# Patient Record
Sex: MALE | URBAN - METROPOLITAN AREA
[De-identification: names, ages, dates, MRNs, and addresses within clinical notes are randomized per-mention and may not be internally consistent; named-entity substitution may affect disease eponyms.]

---
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
## 2025-01-06 ENCOUNTER — OFFICE VISIT (OUTPATIENT)
Dept: FAMILY MEDICINE CLINIC | Facility: CLINIC | Age: 20
End: 2025-01-06
Payer: COMMERCIAL

## 2025-01-06 VITALS
WEIGHT: 120 LBS | HEIGHT: 66 IN | SYSTOLIC BLOOD PRESSURE: 120 MMHG | BODY MASS INDEX: 19.29 KG/M2 | OXYGEN SATURATION: 97 % | RESPIRATION RATE: 16 BRPM | HEART RATE: 60 BPM | TEMPERATURE: 98 F | DIASTOLIC BLOOD PRESSURE: 76 MMHG

## 2025-01-06 DIAGNOSIS — M54.50 ACUTE BILATERAL LOW BACK PAIN WITHOUT SCIATICA: Primary | ICD-10-CM

## 2025-01-06 DIAGNOSIS — M62.838 MUSCLE SPASM: ICD-10-CM

## 2025-01-06 PROCEDURE — 3074F SYST BP LT 130 MM HG: CPT | Performed by: NURSE PRACTITIONER

## 2025-01-06 PROCEDURE — 3008F BODY MASS INDEX DOCD: CPT | Performed by: NURSE PRACTITIONER

## 2025-01-06 PROCEDURE — 99202 OFFICE O/P NEW SF 15 MIN: CPT | Performed by: NURSE PRACTITIONER

## 2025-01-06 PROCEDURE — 3078F DIAST BP <80 MM HG: CPT | Performed by: NURSE PRACTITIONER

## 2025-01-06 RX ORDER — NAPROXEN 500 MG/1
500 TABLET ORAL 2 TIMES DAILY WITH MEALS
Qty: 20 TABLET | Refills: 0 | Status: SHIPPED | OUTPATIENT
Start: 2025-01-06 | End: 2025-01-16

## 2025-01-06 RX ORDER — CYCLOBENZAPRINE HCL 10 MG
10 TABLET ORAL 3 TIMES DAILY PRN
Qty: 15 TABLET | Refills: 0 | Status: SHIPPED | OUTPATIENT
Start: 2025-01-06

## 2025-01-06 NOTE — PROGRESS NOTES
Subjective:   Patient ID: Kenneth Sewell is a 19 year old male.    Patient is a 19 year old male who presents today with complaints of low back pain x 2 days. Right side worse than the left. Has had issues in the past. \"Tweaked\" it this summer and went away with rest/stretching. Cannot identify any triggers the past 2 days. Feels \"tight\" and \"stiff\". Denies fevers, injury/fall, heavy lifting, n/v/d, abdominal pain, loss of bowel/bladder function, numbness/tingling/weakness to extremities, neck pain, visual changes, headaches, UTI symptoms or spinal tenderness. Tolerating PO well at home. Attempted treatment prior to arrival = icy hot, heating pad, cupping, tylenol and motrin.         History/Other:   Review of Systems   Constitutional:  Negative for appetite change and fever.   Eyes:  Negative for visual disturbance.   Gastrointestinal:  Negative for abdominal pain, diarrhea, nausea and vomiting.   Genitourinary:  Negative for dysuria, flank pain, frequency, hematuria and urgency.   Musculoskeletal:  Positive for back pain. Negative for neck pain.   Neurological:  Negative for weakness, numbness and headaches.     Current Outpatient Medications   Medication Sig Dispense Refill    naproxen 500 MG Oral Tab Take 1 tablet (500 mg total) by mouth 2 (two) times daily with meals for 10 days. 20 tablet 0    cyclobenzaprine 10 MG Oral Tab Take 1 tablet (10 mg total) by mouth 3 (three) times daily as needed for Muscle spasms. 15 tablet 0    amoxicillin 875 MG Oral Tab Take 1 tablet (875 mg total) by mouth 2 (two) times daily. 20 tablet 0     Allergies:Allergies[1]    /76   Pulse 60   Temp 97.6 °F (36.4 °C)   Resp 16   Ht 5' 6\" (1.676 m)   Wt 120 lb (54.4 kg)   SpO2 97%   BMI 19.37 kg/m²       Objective:   Physical Exam  Vitals reviewed.   Constitutional:       General: He is awake. He is not in acute distress.     Appearance: Normal appearance. He is well-developed and well-groomed. He is not ill-appearing,  toxic-appearing or diaphoretic.   HENT:      Head: Normocephalic and atraumatic.   Cardiovascular:      Rate and Rhythm: Normal rate and regular rhythm.   Pulmonary:      Effort: Pulmonary effort is normal. No respiratory distress.      Breath sounds: Normal breath sounds and air entry. No decreased breath sounds, wheezing, rhonchi or rales.   Musculoskeletal:      Cervical back: Normal. No bony tenderness.      Thoracic back: Normal. No bony tenderness.      Lumbar back: Tenderness present. No bony tenderness.        Back:    Skin:     General: Skin is warm and dry.   Neurological:      Mental Status: He is alert and oriented to person, place, and time.   Psychiatric:         Behavior: Behavior is cooperative.         Assessment & Plan:   1. Acute bilateral low back pain without sciatica    2. Muscle spasm        No orders of the defined types were placed in this encounter.      Meds This Visit:  Requested Prescriptions     Signed Prescriptions Disp Refills    naproxen 500 MG Oral Tab 20 tablet 0     Sig: Take 1 tablet (500 mg total) by mouth 2 (two) times daily with meals for 10 days.    cyclobenzaprine 10 MG Oral Tab 15 tablet 0     Sig: Take 1 tablet (10 mg total) by mouth 3 (three) times daily as needed for Muscle spasms.     Reassuring physical exam findings. Vitals WNL. No red flags or neurological deficits on exam.  HPI, duration of symptoms and clinical exam findings consistent with MSK etiology.  START Naproxen BID and Flexeril PRN. Discussed possible risks/side effects of medications.  Supportive care and return to care measures reviewed.  Patient v/u and is comfortable with this plan.    Patient Instructions   1. Rest your back and avoid overuse   2. Apply heat or ice to muscles, whichever feels better  3. Take naproxen as prescribed WITH FOOD  4. Do not take any other nsaid while on naproxen (i.e. NO ibuprofen, motrin, advil, aleve, aspirin) as these have additive effects  5. Take tylenol as needed  6.  Take flexeril as prescribed. Take caution while on this medication. Do not drive or operate machinery as this medication may make you drowsy  7. Follow up with primary care within 1 week  8. Seek immediate attention in ER for worsening of symptoms, loss of sensation, decreased range of motrion, bowel or bladder function loss.             [1] No Known Allergies

## (undated) NOTE — LETTER
9/17/2019          To Whom It May Concern:    Crys Saunders is currently under my medical care. Please excuse the patient from [place] missed as he has been ill. May return to cross country when well.     If you require additional information miri